# Patient Record
Sex: FEMALE
[De-identification: names, ages, dates, MRNs, and addresses within clinical notes are randomized per-mention and may not be internally consistent; named-entity substitution may affect disease eponyms.]

---

## 2022-01-31 ENCOUNTER — APPOINTMENT (OUTPATIENT)
Dept: OTOLARYNGOLOGY | Facility: CLINIC | Age: 77
End: 2022-01-31
Payer: MEDICARE

## 2022-01-31 DIAGNOSIS — Z86.79 PERSONAL HISTORY OF OTHER DISEASES OF THE CIRCULATORY SYSTEM: ICD-10-CM

## 2022-01-31 DIAGNOSIS — Z80.9 FAMILY HISTORY OF MALIGNANT NEOPLASM, UNSPECIFIED: ICD-10-CM

## 2022-01-31 DIAGNOSIS — Z87.39 PERSONAL HISTORY OF OTHER DISEASES OF THE MUSCULOSKELETAL SYSTEM AND CONNECTIVE TISSUE: ICD-10-CM

## 2022-01-31 DIAGNOSIS — Z87.891 PERSONAL HISTORY OF NICOTINE DEPENDENCE: ICD-10-CM

## 2022-01-31 DIAGNOSIS — Z86.39 PERSONAL HISTORY OF OTHER ENDOCRINE, NUTRITIONAL AND METABOLIC DISEASE: ICD-10-CM

## 2022-01-31 DIAGNOSIS — H61.22 IMPACTED CERUMEN, LEFT EAR: ICD-10-CM

## 2022-01-31 DIAGNOSIS — Z86.69 PERSONAL HISTORY OF OTHER DISEASES OF THE NERVOUS SYSTEM AND SENSE ORGANS: ICD-10-CM

## 2022-01-31 PROBLEM — Z00.00 ENCOUNTER FOR PREVENTIVE HEALTH EXAMINATION: Status: ACTIVE | Noted: 2022-01-31

## 2022-01-31 PROCEDURE — 99203 OFFICE O/P NEW LOW 30 MIN: CPT | Mod: 25

## 2022-01-31 PROCEDURE — 69210 REMOVE IMPACTED EAR WAX UNI: CPT

## 2022-01-31 PROCEDURE — 31575 DIAGNOSTIC LARYNGOSCOPY: CPT

## 2022-01-31 PROCEDURE — 92567 TYMPANOMETRY: CPT

## 2022-01-31 PROCEDURE — 92557 COMPREHENSIVE HEARING TEST: CPT

## 2022-01-31 RX ORDER — HYDROCHLOROTHIAZIDE 25 MG/1
25 TABLET ORAL
Qty: 90 | Refills: 0 | Status: ACTIVE | COMMUNITY
Start: 2021-01-07

## 2022-01-31 RX ORDER — LOSARTAN POTASSIUM 100 MG/1
100 TABLET, FILM COATED ORAL
Qty: 90 | Refills: 0 | Status: ACTIVE | COMMUNITY
Start: 2021-01-07

## 2022-01-31 RX ORDER — CARVEDILOL 12.5 MG/1
12.5 TABLET, FILM COATED ORAL
Qty: 180 | Refills: 0 | Status: ACTIVE | COMMUNITY
Start: 2021-01-07

## 2022-01-31 RX ORDER — APIXABAN 5 MG/1
5 TABLET, FILM COATED ORAL
Qty: 180 | Refills: 0 | Status: ACTIVE | COMMUNITY
Start: 2021-01-07

## 2022-01-31 RX ORDER — ROSUVASTATIN CALCIUM 10 MG/1
10 TABLET, FILM COATED ORAL
Qty: 90 | Refills: 0 | Status: ACTIVE | COMMUNITY
Start: 2021-01-07

## 2022-01-31 NOTE — HISTORY OF PRESENT ILLNESS
[de-identified] : DUC JULIAN is a 76 year patient Here for a 2 week history of tinnitus. She has a steam like noise in both ears although it is worse on the right side. It is occasionally pulsatile. It does get worse if she turns her head to the right. She denies otalgia, otorrhea, high-pitched tinnitus, or vertigo. She has no nasal or throat symptoms. She has no history of recurrent ear infections, prior otologic surgery, ear trauma, or excessive noise exposure. She denies history of thyroid disease. She does have a history of A. fib. She had recent echocardiogram and stress test. She is on medication for her blood pressure. She also uses an oral appliance for sleep apnea. She started using it a few weeks ago.

## 2022-01-31 NOTE — ASSESSMENT
[FreeTextEntry1] : She has a 2 week history of pulsatile tinnitus which is worse on the right side. She had cerumen impaction on the left side which was removed. 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 She had normal tympanograms though the right side did have a little bit more negative pressure compared to the left. Flexible laryngoscopy was unremarkable. She may have TMJ dysfunction. She has been using oral appliance for sleep apnea. It is unclear if this could be contributing to the symptoms. She also has a history of A. fib. I discussed possible etiologies for pulsatile tinnitus\par \par PLAN\par \par - findings and management options discussed with the patient. \par - Good aural hygiene.\par - noise precautions\par - repeat audiogram in one year\par - literature regarding tinnitus given\par - Avoid substances that can make tinnitus worse.  \par - They may use a white noise maker or leave the tv/radio on when it is quiet \par - She may consider HAE.  Tinnitus therapy may also be helpful if it does not improve\par - MRI with contrast of the IACs to rule out retrocochlear pathology and MRA/MRV of the head and neck to rule out vascular lesions if the tinnitus persists\par -Since the tinnitus has been present for 2 weeks, we will see if it persists over the next 2-3 weeks. I asked her to call me and let me know. If it is still there, we will discuss proceeding with further workup. She also speak with her cardiologist and PCP about it as well. If I do not hear from her, I will assume that it resolved\par - call and return earlier if any concerns or worsening symptoms

## 2022-01-31 NOTE — CONSULT LETTER
[Dear  ___] : Dear  [unfilled], [Consult Letter:] : I had the pleasure of evaluating your patient, [unfilled]. [Please see my note below.] : Please see my note below. [Consult Closing:] : Thank you very much for allowing me to participate in the care of this patient.  If you have any questions, please do not hesitate to contact me. [Sincerely,] : Sincerely, [FreeTextEntry3] : Alla Longo MD\par

## 2022-02-28 ENCOUNTER — APPOINTMENT (OUTPATIENT)
Dept: OTOLARYNGOLOGY | Facility: CLINIC | Age: 77
End: 2022-02-28
Payer: MEDICARE

## 2022-02-28 DIAGNOSIS — H93.13 TINNITUS, BILATERAL: ICD-10-CM

## 2022-02-28 DIAGNOSIS — H93.11 TINNITUS, RIGHT EAR: ICD-10-CM

## 2022-02-28 DIAGNOSIS — H90.3 SENSORINEURAL HEARING LOSS, BILATERAL: ICD-10-CM

## 2022-02-28 PROCEDURE — 99213 OFFICE O/P EST LOW 20 MIN: CPT

## 2022-02-28 RX ORDER — ROSUVASTATIN CALCIUM 20 MG/1
20 TABLET, FILM COATED ORAL
Qty: 90 | Refills: 0 | Status: ACTIVE | COMMUNITY
Start: 2021-12-28

## 2022-02-28 RX ORDER — AMLODIPINE BESYLATE 5 MG/1
5 TABLET ORAL
Qty: 90 | Refills: 0 | Status: ACTIVE | COMMUNITY
Start: 2022-02-02

## 2022-02-28 NOTE — ASSESSMENT
[FreeTextEntry1] : She continues to have bilateral tinnitus which is worse on the right side.  It is occasionally pulsatile. It started in mid-January.  She is on another medication for her blood pressure.  She has occasional occipital discomfort on the left side.  She said that has been present prior to the tinnitus and could be musculoskeletal.  We also discussed the possibility of TMJ dysfunction from using an oral appliance.  She had a bilateral high-frequency sensorineural hearing loss on audiogram.The tinnitus continues to be bothersome. \par \par PLAN\par \par - findings and management options discussed with the patient. \par - Good aural hygiene.\par - noise precautions\par - annual audiogram\par - management of tinnitus again reviewed. \par - Avoid substances that can make tinnitus worse.  \par - She may use a white noise maker or leave the tv/radio on when it is quiet \par - She may consider HAE and tinnitus therapy may also be helpful She does not want hearing aids but will look into tinnitus therapy\par - MRI with contrast of the IACs to rule out retrocochlear pathology and MRA/MRV of the head and neck to rule out vascular lesions were suggested. She would like to check with her dentist first to see if she has TMJD.  SHe is also going to see her chiropractor for the posterior neck/occipital discomfort. \par -I asked her to call me in 3 weeks with an update in her condition.  if there is no improvement, she said she will go fot the scans. \par - call and return earlier if any concerns or worsening symptoms

## 2022-02-28 NOTE — HISTORY OF PRESENT ILLNESS
[de-identified] : DUC JULIAN is a 76 year patient here for persistent tinnitus.  It is bilateral but worse on the right side.  It is occasionally pulsatile.  It has been present since mid January.  She had no preceding event.  She did have another medication added for her blood pressure.  She occasionally has left occipital discomfort but that has been present for a while. \par \par ENT history\par no history of recurrent ear infections, prior otologic surgery, ear trauma, or excessive noise exposure.\par She uses an oral appliance for sleep apnea \par She sees a cardiologist

## 2022-02-28 NOTE — CONSULT LETTER
[Dear  ___] : Dear  [unfilled], [Courtesy Letter:] : I had the pleasure of seeing your patient, [unfilled], in my office today. [Please see my note below.] : Please see my note below. [Consult Closing:] : Thank you very much for allowing me to participate in the care of this patient.  If you have any questions, please do not hesitate to contact me. [Sincerely,] : Sincerely, [FreeTextEntry3] : Alla Longo MD\par